# Patient Record
Sex: FEMALE | Race: WHITE | NOT HISPANIC OR LATINO | Employment: FULL TIME | URBAN - METROPOLITAN AREA
[De-identification: names, ages, dates, MRNs, and addresses within clinical notes are randomized per-mention and may not be internally consistent; named-entity substitution may affect disease eponyms.]

---

## 2020-07-21 ENCOUNTER — OFFICE VISIT (OUTPATIENT)
Dept: CARDIOLOGY | Facility: CLINIC | Age: 65
End: 2020-07-21

## 2020-07-21 VITALS
DIASTOLIC BLOOD PRESSURE: 71 MMHG | SYSTOLIC BLOOD PRESSURE: 110 MMHG | HEART RATE: 83 BPM | OXYGEN SATURATION: 98 % | HEIGHT: 61 IN | BODY MASS INDEX: 36.06 KG/M2 | WEIGHT: 191 LBS

## 2020-07-21 DIAGNOSIS — R06.09 DYSPNEA ON EXERTION: ICD-10-CM

## 2020-07-21 DIAGNOSIS — Z82.49 FAMILY HISTORY OF PREMATURE CAD: ICD-10-CM

## 2020-07-21 DIAGNOSIS — I10 ESSENTIAL HYPERTENSION: ICD-10-CM

## 2020-07-21 DIAGNOSIS — N18.30 CKD (CHRONIC KIDNEY DISEASE) STAGE 3, GFR 30-59 ML/MIN (HCC): ICD-10-CM

## 2020-07-21 DIAGNOSIS — E78.5 DYSLIPIDEMIA: ICD-10-CM

## 2020-07-21 DIAGNOSIS — E11.9 TYPE 2 DIABETES MELLITUS WITHOUT COMPLICATION, WITHOUT LONG-TERM CURRENT USE OF INSULIN (HCC): ICD-10-CM

## 2020-07-21 DIAGNOSIS — R07.2 PRECORDIAL PAIN: Primary | ICD-10-CM

## 2020-07-21 PROCEDURE — 99204 OFFICE O/P NEW MOD 45 MIN: CPT | Performed by: INTERNAL MEDICINE

## 2020-07-21 PROCEDURE — 93000 ELECTROCARDIOGRAM COMPLETE: CPT | Performed by: INTERNAL MEDICINE

## 2020-07-21 RX ORDER — LOSARTAN POTASSIUM 100 MG/1
100 TABLET ORAL DAILY
COMMUNITY

## 2020-07-21 RX ORDER — ESTRADIOL 2 MG/1
2 TABLET ORAL DAILY
COMMUNITY

## 2020-07-21 RX ORDER — LORATADINE 10 MG/1
CAPSULE, LIQUID FILLED ORAL
COMMUNITY

## 2020-07-21 RX ORDER — ATORVASTATIN CALCIUM 20 MG/1
20 TABLET, FILM COATED ORAL DAILY
COMMUNITY

## 2020-07-21 RX ORDER — VENLAFAXINE HYDROCHLORIDE 150 MG/1
150 CAPSULE, EXTENDED RELEASE ORAL DAILY
COMMUNITY

## 2020-07-21 RX ORDER — EPINASTINE HCL 0.05 %
1 DROPS OPHTHALMIC (EYE) 2 TIMES DAILY
COMMUNITY

## 2020-07-21 RX ORDER — TRIAMTERENE AND HYDROCHLOROTHIAZIDE 37.5; 25 MG/1; MG/1
1 CAPSULE ORAL EVERY MORNING
COMMUNITY

## 2020-07-21 RX ORDER — ASPIRIN 81 MG/1
81 TABLET, CHEWABLE ORAL DAILY
COMMUNITY

## 2020-07-21 RX ORDER — POTASSIUM CHLORIDE 20 MEQ/1
20 TABLET, EXTENDED RELEASE ORAL 2 TIMES DAILY
COMMUNITY

## 2020-07-21 NOTE — PROGRESS NOTES
Cardiology Consult Note    Patient Identification:  Name: Josefina Valentine  Age: 64 y.o.  Sex: female  :  1955  MRN: [id number]              Requesting Physician : Dr. Ronal Gomez    Reason for Consultation / Chief Complaint : management recommendations and patient co-management chest pain    History of Present Illness:      This is a 64-year-old with PMH of    -Hypertension  -Dyslipidemia  -Type 2 diabetes  -IBETH, PAD anxiety, DDD, GERD, pituitary adenoma, meningioma, Lau's neuroma  -CKD, stage III  -ALEXANDRA  -Allergy/intolerance to erythromycin and sulfa  -Family history of premature CAD brother  at 54    Here for evaluation of chest pain and edema.  Patient has been noticing recurrent, midsternal, chest pain which is sometimes sharp sometimes dull, sometimes lasts only 2 to 3 minutes sometimes lasts all day on and off.  No aggravating or relieving factors.  Sometimes feels like emotional stress brings it on.  Has been having tiredness and on  felt like she had no energy.  Labs from 7/10/2020 reveal CMP with a glucose of 127 BUN/creatinine of 25/1.16.  Cholesterol 241 triglycerides 169 HDL 78 .  Patient's arterial blood pressure is 110/71, heart rate 83, O2 sat of 98% on room air.  Patient denies any history of smoking    Assessment:      -Recurrent chest pain of unclear etiology  -Hypertension, dyslipidemia, type 2 diabetes  -PVCs  -Positive family history of CAD      Recommendations / Plan:        Reviewed EKG results with patient  Patient's likelihood of having CAD is intermediate to high given her multiple risk factors.  We will schedule her for a stress Cardiolite.  Risk benefits alternatives explained.  We will continue aspirin atorvastatin losartan Maxide and KCl.  We will follow-up after testing and consider further evaluation and treatment.           Diagnosis Plan   1. Precordial pain  Stress Test With Myocardial Perfusion One Day   2. Dyspnea on exertion  Stress Test With  Myocardial Perfusion One Day   3. Dyslipidemia  Stress Test With Myocardial Perfusion One Day   4. Essential hypertension  Stress Test With Myocardial Perfusion One Day   5. Type 2 diabetes mellitus without complication, without long-term current use of insulin (CMS/Formerly [facility])  Stress Test With Myocardial Perfusion One Day   6. Family history of premature CAD  Stress Test With Myocardial Perfusion One Day   7. CKD (chronic kidney disease) stage 3, GFR 30-59 ml/min (CMS/[facility] [place])                Past Medical History:  Past Medical History:   Diagnosis Date   • Anxiety    • Diabetes mellitus (CMS/[facility] [place])    • GERD (gastroesophageal reflux disease)    • Hyperlipidemia    • Hypertension      Past Surgical History:  Past Surgical History:   Procedure Laterality Date   • D&C AND LAPAROSCOPY     • HYSTERECTOMY        Allergies:  Allergies   Allergen Reactions   • Erythromycin Other (See Comments)     Abdominal pain   • Sulfa Antibiotics Other (See Comments)     Abdominal pain     Home Meds:  Current Meds:     Current Outpatient Medications:   •  aspirin 81 MG chewable tablet, Chew 81 mg Daily., Disp: , Rfl:   •  atorvastatin (LIPITOR) 20 MG tablet, Take 20 mg by mouth Daily., Disp: , Rfl:   •  Epinastine HCl 0.05 % ophthalmic solution, 1 drop 2 (Two) Times a Day., Disp: , Rfl:   •  esomeprazole (nexIUM) 20 MG capsule, Take 20 mg by mouth Every Morning Before Breakfast., Disp: , Rfl:   •  estradiol (ESTRACE) 2 MG tablet, Take 2 mg by mouth Daily., Disp: , Rfl:   •  Loratadine 10 MG capsule, Take  by mouth., Disp: , Rfl:   •  losartan (COZAAR) 100 MG tablet, Take 100 mg by mouth Daily., Disp: , Rfl:   •  metFORMIN (GLUCOPHAGE) 500 MG tablet, Take 500 mg by mouth 2 (Two) Times a Day With Meals., Disp: , Rfl:   •  potassium chloride (K-DUR,KLOR-CON) 20 MEQ CR tablet, Take 20 mEq by mouth 2 (Two) Times a Day., Disp: , Rfl:   •  triamterene-hydrochlorothiazide (DYAZIDE) 37.5-25 MG per capsule, Take 1 capsule by mouth Every Morning., Disp: ,  "Rfl:   •  venlafaxine XR (EFFEXOR-XR) 150 MG 24 hr capsule, Take 150 mg by mouth Daily., Disp: , Rfl:   Social History:   Social History     Tobacco Use   • Smoking status: Never Smoker   • Smokeless tobacco: Never Used   Substance Use Topics   • Alcohol use: Not on file      Family History:  Family History   Problem Relation Age of Onset   • Heart attack Paternal Uncle    • Heart attack Paternal Uncle    • Heart attack Paternal Uncle         Review of Systems : Review of Systems   Constitution: Positive for malaise/fatigue. Negative for fever, weight gain and weight loss.   HENT: Positive for hearing loss. Negative for congestion and nosebleeds.    Eyes: Negative for double vision and visual disturbance.   Cardiovascular: Positive for chest pain and leg swelling. Negative for claudication, dyspnea on exertion, near-syncope, palpitations and syncope.   Respiratory: Negative for cough, hemoptysis and shortness of breath.    Endocrine: Negative for cold intolerance, heat intolerance, polydipsia and polyphagia.   Hematologic/Lymphatic: Does not bruise/bleed easily.   Skin: Negative for color change and rash.   Musculoskeletal: Negative for arthritis, back pain and joint pain.   Gastrointestinal: Positive for heartburn. Negative for abdominal pain, diarrhea, hematochezia, melena, nausea and vomiting.   Genitourinary: Negative for dysuria and hematuria.   Neurological: Positive for excessive daytime sleepiness. Negative for dizziness and seizures.   Psychiatric/Behavioral: Negative for altered mental status and depression. The patient is nervous/anxious.    All other systems reviewed and are negative.            Constitutional:  Heart Rate:  [83] 83  BP: (110)/(71) 110/71    Physical Exam   /71   Pulse 83   Ht 154.9 cm (61\")   Wt 86.6 kg (191 lb)   SpO2 98%   BMI 36.09 kg/m²   Physical Exam  General:  Appears in no acute distress  Eyes: Sclera is anicteric,  conjunctiva is clear   HEENT:  No JVD. Thyroid not " visibly enlarged. No mucosal pallor or cyanosis  Respiratory: Respirations regular and unlabored at rest.  Bilaterally good breath sounds, with good air entry in all fields. No crackles, rubs or wheezes auscultated  Cardiovascular: S1,S2 Regular rate and rhythm. No murmur, rub or gallop auscultated. No pretibial pitting edema  Gastrointestinal: Abdomen soft, flat, non tender. Bowel sounds present.   Musculoskeletal:  No abnormal movements  Extremities: No digital clubbing or cyanosis  Skin: Color pink. Skin warm and dry to touch. No rashes  No xanthoma  Neuro: Alert and awake, no lateralizing deficits appreciated    Cardiographics  ECG: EKG tracing was  personally reviewed by me    ECG 12 Lead  Date/Time: 7/21/2020 12:27 PM  Performed by: Clarence Akhtar MD  Authorized by: Clarence Akhtar MD   Comparison: not compared with previous ECG   Previous ECG: no previous ECG available  Comments: Sinus rhythm with rate of 76 bpm with PVCs and PACs and right bundle branch block, no comparison EKG available          From today reveals sinus rhythm at the rate of 76 bpm with PVCs and PACs and right bundle branch block.  No comparison EKG available.           Imaging  Chest X-ray:   Imaging Results (Last 24 Hours)     ** No results found for the last 24 hours. **          Lab Review: I have reviewed the labs              @[email or web address]@                  Clarence Akhtar MD  7/21/2020, 17:01      [facility] Dragon/Transcription:   Dictated utilizing Dragon dictation

## 2020-09-08 ENCOUNTER — TELEPHONE (OUTPATIENT)
Dept: CARDIOLOGY | Facility: CLINIC | Age: 65
End: 2020-09-08

## 2020-09-08 NOTE — TELEPHONE ENCOUNTER
Called patient to follow up about scheduling stress test and she said that she didn't want to do it right now due to the cost.    Thank you